# Patient Record
Sex: MALE | Race: BLACK OR AFRICAN AMERICAN | NOT HISPANIC OR LATINO | Employment: UNEMPLOYED | URBAN - METROPOLITAN AREA
[De-identification: names, ages, dates, MRNs, and addresses within clinical notes are randomized per-mention and may not be internally consistent; named-entity substitution may affect disease eponyms.]

---
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
## 2024-01-18 ENCOUNTER — OFFICE VISIT (OUTPATIENT)
Dept: OBGYN CLINIC | Facility: CLINIC | Age: 19
End: 2024-01-18
Payer: COMMERCIAL

## 2024-01-18 ENCOUNTER — APPOINTMENT (OUTPATIENT)
Dept: RADIOLOGY | Facility: AMBULARY SURGERY CENTER | Age: 19
End: 2024-01-18
Attending: STUDENT IN AN ORGANIZED HEALTH CARE EDUCATION/TRAINING PROGRAM
Payer: COMMERCIAL

## 2024-01-18 VITALS — BODY MASS INDEX: 23.62 KG/M2 | WEIGHT: 165 LBS | HEIGHT: 70 IN

## 2024-01-18 DIAGNOSIS — S72.302D CLOSED FRACTURE OF SHAFT OF LEFT FEMUR WITH ROUTINE HEALING, UNSPECIFIED FRACTURE MORPHOLOGY, SUBSEQUENT ENCOUNTER: Primary | ICD-10-CM

## 2024-01-18 DIAGNOSIS — S72.302D CLOSED FRACTURE OF SHAFT OF LEFT FEMUR WITH ROUTINE HEALING, UNSPECIFIED FRACTURE MORPHOLOGY, SUBSEQUENT ENCOUNTER: ICD-10-CM

## 2024-01-18 PROCEDURE — 73552 X-RAY EXAM OF FEMUR 2/>: CPT

## 2024-01-18 PROCEDURE — 99213 OFFICE O/P EST LOW 20 MIN: CPT | Performed by: STUDENT IN AN ORGANIZED HEALTH CARE EDUCATION/TRAINING PROGRAM

## 2024-01-18 NOTE — PROGRESS NOTES
Orthopaedic Surgery - Office Note  Laura Guerrero (18 y.o. male)   : 2005   MRN: [id number]  Encounter Date: 2024    Chief Complaint   Patient presents with    Left Hip - Post-op     Past Surgical History:   Procedure Laterality Date    [place] OPTX FEM SHFT FX W/INSJ IMED IMPLT W/WO SCREW Left 2023    Procedure: INSERTION NAIL IM FEMUR ANTEGRADE (TROCHANTERIC);  Surgeon: Theo Patel DO;  Location: [facility];  Service: Orthopedics     Assessment / Plan  #1 left proximal one third femoral shaft fracture, status post open reduction internal fixation with an intramedullary nail, antegrade on 2023    X-rays of the left femur were obtained today and discussed with patient which demonstrate maintained alignment of the patient's midshaft femoral fracture.  The patient has no change in alignment.  No signs of hardware loosening or failure.  Maturing of the callus at the fracture site with fracture union  Patient will continue to be weightbearing as tolerated activity as tolerated, range of motion as tolerated to the left lower extremity  Patient has no work restrictions or activity restrictions.  Patient will continue with physical therapy directed at strengthening of the hip abductors and continued range of motion of the left knee  Continue to take anti-inflammatory medications, Tylenol as needed for pain relief  Patient may follow-up PRN       History of Present Illness  Laura Guerrero is a 18 y.o. male who presents 7 days status post open reduction internal fixation with intramedullary nail placement of the left femur.  The patient had called about pain in the posterior thigh.  He was seen early due to this.  The patient has no pain while at rest however with any motion of the knee and then begins to scream in a histrionic fashion.  The patient has had no secondary falls since the injury.  No pain at the knee.  No instability.  Patient has had no drainage from any of his wounds.  No  redness or erythema.  The patient denies any numbness or paresthesias in the left lower extremity.    Interval history 7/11/2023:  The patient is doing well.  Today he has minimal soreness over left lateral hip.  The patient had his last visit was having issues with knee stiffness.  He was hesitant to start bending the knee and was only able to move the knee in a limited flexion arc.  The patient has started inpatient physical therapy and his range of motion of the left knee has improved dramatically.  His pain is much less than it was at his last visit.  Denies any numbness or paresthesias.  Denies any fevers or chills.  Steadily increasing the amount of weight he is putting on the left lower extremity.  Only using 1 crutch at today's visit..     Interval history 8/14/2023  Patient is an 17-year-old male who is now 8 weeks status post open reduction internal fixation of the left midshaft femur fracture.  Patient is doing much better.  He is working with physical therapy for range of motion and strengthening the left lower extremity.  He has weaned off of all assistive devices.  The patient still has a antalgic gait.  He denies pain with weightbearing or range of motion.  Denies any numbness or paresthesias.    Interval history 10/9/2023:  Patient presents 3+ months s/p left femur IM nail for mid-shaft fracture, 6/20/2023.  He is ding well and improving.  Today he complains of occasional mid-thigh generalized pain and lateral hip.  He can walk 10 minutes prior to increase of pain.  He rates his pain at 6/10.  He continues with physical therapy with benefit.  He denies medications for this issue.  He does not work.  Patient denies any numbness or paresthesias.  Denies any fevers or chills.  Patient is ambulating without limp    Interval history 1/18/2024  Patient presents approximately 7 months status post left femur IM nail for midshaft femur fracture on 6/20/2023.  He is feeling well overall and improving.   Complains of occasional mid thigh pain, leg weakness and lateral hip pain. He denies any significant pain at this time.  He has been able to return to work and is happy with his progress.  He states that the injury does not limit any of his activities at this time.  He denies any numbness tingling or paresthesias in the left lower extremity.  No other complaints at this time.    Review of Systems  Pertinent items are noted in HPI.  All other systems were reviewed and are negative.    Physical Exam  There were no vitals taken for this visit.  Cons: Appears well.  No apparent distress.  Psych: Alert. Oriented x3.  Mood and affect normal.  Eyes: PERRLA, EOMI  Resp: Normal effort.  No audible wheezing or stridor.  CV: Palpable pulse.  No discernable arrhythmia.    Lymph:  No palpable cervical, axillary, or inguinal lymphadenopathy.  Skin: Warm.  No palpable masses.  No visible lesions.  Neuro: Normal muscle tone.  Normal and symmetric DTR's.     The left lower extremity was exposed inspected.  Patient's previous surgical incisions were intact without any signs of dehiscence.  No surrounding erythema or induration.  Incisions well-healed and sealed.  The knee is stable to anterior and posterior drawer testing.  No laxity with varus and valgus stress.   Patient's range of motion today was from 0 to 120 degrees of flexion.  Without pain.  The patient has no tenderness up by the hip or in the posterior aspect of the thigh with palpation.  Nonantalgic gait pattern without assistive device. patient's compartments are soft compressible.  5 out of 5 muscle strength with knee extension, knee flexion, hip flexion, hip extension.  5 out of 5 hip abduction and adduction.patient's sensation is intact to light touch in the superficial peroneal, deep peroneal, sural, saphenous, plantar nerve distributions.  Tibialis anterior, extensor hallux longus, gastrocnemius muscles intact.  +2 dorsalis pedis and posterior tibial  pulses    Studies Reviewed  X-rays of the left femur were obtained today which demonstrate maintained alignment of the patient's midshaft femoral fracture.  The patient has no change in alignment.  No signs of hardware loosening or failure.  Abundant callus formation with maturing of the fracture union  Procedures      Medical, Surgical, Family, and Social History  The patient's medical history, family history, and social history, were reviewed and updated as appropriate.    No past medical history on file.        Family History   Problem Relation Age of Onset    No Known Problems Mother     No Known Problems Father        Social History     Occupational History    Not on file   Tobacco Use    Smoking status: Every Day     Types: Cigarettes     Passive exposure: Yes    Smokeless tobacco: Never   Vaping Use    Vaping status: Every Day    Substances: Nicotine, THC   Substance and Sexual Activity    Alcohol use: Not Currently    Drug use: Yes     Types: Marijuana    Sexual activity: Not on file       Allergies   Allergen Reactions    Other      Seasonal Allergies         Current Outpatient Medications:     acetaminophen (TYLENOL) 325 mg tablet, Take 2 tablets (650 mg total) by mouth every 4 (four) hours as needed for mild pain, Disp: , Rfl: 0    amoxicillin-clavulanate (AUGMENTIN) 875-125 mg per tablet, Take 1 tablet by mouth every 12 (twelve) hours Take for 10 days (d/t sinus infection), Disp: , Rfl:     aspirin (ECOTRIN LOW STRENGTH) 81 mg EC tablet, Take 1 tablet (81 mg total) by mouth 2 (two) times a day, Disp: 84 tablet, Rfl: 0    Cetirizine HCl (ZYRTEC CHILDRENS ALLERGY) 5 MG/5ML SOLN, Take 10 mL by mouth 3 (three) times a day, Disp: , Rfl:     docusate sodium (Colace) 100 mg capsule, Take 100 mg by mouth as needed for constipation. Indications: Constipation, Disp: , Rfl:     Ibuprofen 200 MG CAPS, Take 2 capsules by mouth if needed (pain/inflammation). Indications: Pain, Disp: , Rfl:     methocarbamol (ROBAXIN)  750 mg tablet, Take 1 tablet (750 mg total) by mouth every 6 (six) hours for 14 days, Disp: 56 tablet, Rfl: 0    oxyCODONE (Roxicodone) 5 immediate release tablet, Take 1 tablet (5 mg total) by mouth every 4 (four) hours as needed for moderate pain Max Daily Amount: 30 mg (Patient not taking: Reported on 8/14/2023), Disp: 30 tablet, Rfl: 0    pantoprazole (PROTONIX) 40 mg tablet, Take 1 tablet (40 mg total) by mouth daily, Disp: 42 tablet, Rfl: 0    senna-docusate sodium (SENOKOT S) 8.6-50 mg per tablet, Take 1 tablet by mouth daily at bedtime for 5 days, Disp: 5 tablet, Rfl: 0      Ritesh Hong PA-C    Scribe Attestation      I,:   am acting as a scribe while in the presence of the attending physician.:       I,:   personally performed the services described in this documentation    as scribed in my presence.:

## 2024-07-22 ENCOUNTER — HOSPITAL ENCOUNTER (EMERGENCY)
Facility: HOSPITAL | Age: 19
Discharge: HOME | End: 2024-07-22
Attending: INTERNAL MEDICINE
Payer: COMMERCIAL

## 2024-07-22 VITALS
BODY MASS INDEX: 20.36 KG/M2 | WEIGHT: 142.2 LBS | RESPIRATION RATE: 18 BRPM | HEIGHT: 70 IN | TEMPERATURE: 98.6 F | SYSTOLIC BLOOD PRESSURE: 120 MMHG | DIASTOLIC BLOOD PRESSURE: 68 MMHG | HEART RATE: 68 BPM | OXYGEN SATURATION: 99 %

## 2024-07-22 DIAGNOSIS — Z20.2 EXPOSURE TO STD: Primary | ICD-10-CM

## 2024-07-22 LAB
C TRACH DNA SPEC QL NAA+PROBE: NEGATIVE
N GONORRHOEA DNA SPEC QL NAA+PROBE: POSITIVE

## 2024-07-22 PROCEDURE — 99283 EMERGENCY DEPT VISIT LOW MDM: CPT

## 2024-07-22 PROCEDURE — 96372 THER/PROPH/DIAG INJ SC/IM: CPT

## 2024-07-22 PROCEDURE — 99284 EMERGENCY DEPT VISIT MOD MDM: CPT | Performed by: INTERNAL MEDICINE

## 2024-07-22 PROCEDURE — 87591 N.GONORRHOEAE DNA AMP PROB: CPT | Performed by: INTERNAL MEDICINE

## 2024-07-22 PROCEDURE — 87491 CHLMYD TRACH DNA AMP PROBE: CPT | Performed by: INTERNAL MEDICINE

## 2024-07-22 RX ORDER — DOXYCYCLINE HYCLATE 100 MG/1
100 CAPSULE ORAL 2 TIMES DAILY
Qty: 14 CAPSULE | Refills: 0 | Status: SHIPPED | OUTPATIENT
Start: 2024-07-22 | End: 2024-07-29

## 2024-07-22 RX ORDER — DOXYCYCLINE HYCLATE 100 MG/1
100 CAPSULE ORAL ONCE
Status: COMPLETED | OUTPATIENT
Start: 2024-07-22 | End: 2024-07-22

## 2024-07-22 RX ADMIN — LIDOCAINE HYDROCHLORIDE 500 MG: 10 INJECTION, SOLUTION EPIDURAL; INFILTRATION; INTRACAUDAL; PERINEURAL at 07:52

## 2024-07-22 RX ADMIN — DOXYCYCLINE 100 MG: 100 CAPSULE ORAL at 07:52

## 2024-07-22 NOTE — DISCHARGE INSTRUCTIONS
Follow-up with your PMD.  Take medication as prescribed.  Your significant other need to be treated also.      Call you about the results of the GC/chlamydia.

## 2024-07-22 NOTE — ED PROVIDER NOTES
History  Chief Complaint   Patient presents with    Exposure to STD     Pt reports possible exposure and will like to be tested     This is a 18 years old came for having penile discharge.  Patient stated that he found yellowish-whitish thick discharge coming from the penis.  Patient also stated that he has some dysuria.  Patient has no hematuria.  Patient has no fever.  Patient is sexually active with his girlfriend and the last sexual activity was 2 days ago.  Patient has history of chlamydia in the past and it was treated.  Patient denies other STDs.  Currently patient takes no medication.  Patient had ORIF of left femur.  Patient denies any fever, abdominal pain, flank pain.  Patient denies history of kidney stones.        Prior to Admission Medications   Prescriptions Last Dose Informant Patient Reported? Taking?   Cetirizine HCl (ZYRTEC CHILDRENS ALLERGY) 5 MG/5ML SOLN  Mother, Self Yes No   Sig: Take 10 mL by mouth 3 (three) times a day   Ibuprofen 200 MG CAPS  Self, Mother Yes No   Sig: Take 2 capsules by mouth if needed (pain/inflammation). Indications: Pain   acetaminophen (TYLENOL) 325 mg tablet  Self, Mother No No   Sig: Take 2 tablets (650 mg total) by mouth every 4 (four) hours as needed for mild pain   amoxicillin-clavulanate (AUGMENTIN) 875-125 mg per tablet  Mother, Self Yes No   Sig: Take 1 tablet by mouth every 12 (twelve) hours Take for 10 days (d/t sinus infection)   aspirin (ECOTRIN LOW STRENGTH) 81 mg EC tablet  Self No No   Sig: Take 1 tablet (81 mg total) by mouth 2 (two) times a day   docusate sodium (Colace) 100 mg capsule  Self, Mother Yes No   Sig: Take 100 mg by mouth as needed for constipation. Indications: Constipation   methocarbamol (ROBAXIN) 750 mg tablet  Self No No   Sig: Take 1 tablet (750 mg total) by mouth every 6 (six) hours for 14 days   oxyCODONE (Roxicodone) 5 immediate release tablet  Self, Mother No No   Sig: Take 1 tablet (5 mg total) by mouth every 4 (four) hours as  needed for moderate pain Max Daily Amount: 30 mg   Patient not taking: Reported on 1/18/2024   pantoprazole (PROTONIX) 40 mg tablet  Self No No   Sig: Take 1 tablet (40 mg total) by mouth daily   senna-docusate sodium (SENOKOT S) 8.6-50 mg per tablet  Self No No   Sig: Take 1 tablet by mouth daily at bedtime for 5 days      Facility-Administered Medications: None       History reviewed. No pertinent past medical history.    Past Surgical History:   Procedure Laterality Date    [place] OPTX FEM SHFT FX W/INSJ IMED IMPLT W/WO SCREW Left 6/20/2023    Procedure: INSERTION NAIL IM FEMUR ANTEGRADE (TROCHANTERIC);  Surgeon: Theo Patel DO;  Location[facility];  Service: Orthopedics       Family History   Problem Relation Age of Onset    No Known Problems Mother     No Known Problems Father      I have reviewed and agree with the history as documented.    E-Cigarette/Vaping    E-Cigarette Use Current Every Day User      E-Cigarette/Vaping Substances    Nicotine Yes     THC Yes      Social History     Tobacco Use    Smoking status: Former     Types: Cigarettes     Passive exposure: Yes    Smokeless tobacco: Never   Vaping Use    Vaping status: Every Day    Substances: Nicotine, THC   Substance Use Topics    Alcohol use: Not Currently     Comment: Socially    Drug use: Yes     Types: Marijuana       Review of Systems   Constitutional:  Negative for chills and fever.   HENT:  Negative for congestion and sore throat.    Eyes:  Negative for pain and visual disturbance.   Respiratory:  Negative for cough and shortness of breath.    Cardiovascular:  Negative for chest pain and palpitations.   Gastrointestinal:  Negative for abdominal pain and vomiting.   Genitourinary:  Positive for dysuria and penile discharge. Negative for decreased urine volume, difficulty urinating, flank pain, frequency, hematuria, penile pain, penile swelling, scrotal swelling, testicular pain and urgency.   Musculoskeletal:  Negative for arthralgias and  back pain.   Skin:  Negative for color change and rash.   Neurological:  Negative for seizures and syncope.   Hematological:  Negative for adenopathy. Does not bruise/bleed easily.   Psychiatric/Behavioral:  Negative for agitation and behavioral problems.    All other systems reviewed and are negative.      Physical Exam  Physical Exam  Vitals and nursing note reviewed.   Constitutional:       General: He is not in acute distress.     Appearance: He is well-developed. He is not ill-appearing, toxic-appearing or diaphoretic.   HENT:      Head: Normocephalic and atraumatic.      Right Ear: Ear canal normal.      Left Ear: Ear canal normal.      Nose: Nose normal. No congestion or rhinorrhea.      Mouth/Throat:      Mouth: Mucous membranes are moist.      Pharynx: No oropharyngeal exudate or posterior oropharyngeal erythema.   Eyes:      Extraocular Movements: Extraocular movements intact.      Conjunctiva/sclera: Conjunctivae normal.      Pupils: Pupils are equal, round, and reactive to light.   Cardiovascular:      Rate and Rhythm: Normal rate and regular rhythm.      Heart sounds: No murmur heard.     No friction rub. No gallop.   Pulmonary:      Effort: Pulmonary effort is normal. No respiratory distress.      Breath sounds: Normal breath sounds. No stridor.   Abdominal:      General: Abdomen is flat. There is no distension.      Palpations: Abdomen is soft. There is no mass.      Tenderness: There is no abdominal tenderness. There is no right CVA tenderness, left CVA tenderness, guarding or rebound.      Hernia: No hernia is present.   Genitourinary:     Comments: Patient refused Genital exam.   Musculoskeletal:         General: No swelling or tenderness.      Cervical back: Normal range of motion and neck supple.   Skin:     General: Skin is warm and dry.      Capillary Refill: Capillary refill takes less than 2 seconds.   Neurological:      Mental Status: He is alert and oriented to person, place, and time.    Psychiatric:         Mood and Affect: Mood normal.         Behavior: Behavior normal.         Vital Signs  ED Triage Vitals [07/22/24 0717]   Temperature Pulse Respirations Blood Pressure SpO2   98.6 °F (37 °C) 68 18 120/68 99 %      Temp Source Heart Rate Source Patient Position - Orthostatic VS BP Location FiO2 (%)   Oral Monitor Lying Left arm --      Pain Score       --           Vitals:    07/22/24 0717   BP: 120/68   Pulse: 68   Patient Position - Orthostatic VS: Lying         Visual Acuity      ED Medications  Medications   cefTRIAXone (ROCEPHIN) 500 mg in lidocaine (PF) (XYLOCAINE-MPF) 1 % IM only syringe (500 mg Intramuscular Given 7/22/24 0752)   doxycycline hyclate (VIBRAMYCIN) capsule 100 mg (100 mg Oral Given 7/22/24 0752)       Diagnostic Studies  Results Reviewed       Procedure Component Value Units Date/Time    Chlamydia/GC amplified DNA by PCR [[id number]]  (Abnormal) Collected: 07/22/24 0751    Lab Status: Final result Specimen: Urine, Other Updated: 07/22/24 1450     N gonorrhoeae, DNA Probe Positive     Chlamydia trachomatis, DNA Probe Negative    Narrative:      This test was performed using the FDA-approved Stephan 6800 CT/NG assay ([employer]). This test uses real-time PCR to detect Chlamydia trachomatis (CT) and Neisseria gonorrhoeae (NG). This instrument and assay have been validated by the  and performing laboratory and verified by the performing laboratory.  This test is intended as an aid in the diagnosis of chlamydial and gonococcal disease. This test has not been evaluated in patients younger than 14 years of age and is not recommended for evaluation of suspected sexual abuse. This assay is not intended to replace other exams or tests for diagnosis of urogenital infection by causative factors other than Chalmydia trachomatis (CT) and Neisseria gonorrhoeae (NG). Additional testing is recommended when the results do not correlate with clinical signs and symptoms.    Procedural Limitations  This assay has only been validated for use with male and female urine, clinician-instructed self-collected vaginal swab specimens, clinician-collected vaginal swab specimens, endocervical swab specimens collected in silke® PCR Media and cervical specimens collected in PreservCyt® Solution. Assay performance has not been validated for use with other collection media and/or specimen types.   Detection of C. trachomatis and N. gonorrhoeaea is dependent on the number of organisms present in the specimen. Detection may be affected by specimen collection methods, patient factors, stage of infection, infecting strains, and presence of polymerase/PCR inhibitors.   When CT is present at very high concentrations, the detection of NG present at concentrations near the limit of detection may be impacted.  The presence of mucus in endocervical specimens may lead to false negative results.  The presence of whole blood in urine and cervical specimens collected in PreservCyt Solution may lead to false negative and/or invalid test results.   Urine testing is recommended to be performed on first catch urine samples. The effects of other collection variables have not been evaluated at this time. The effects of vaginal discharge, tampon use, douching, and other collection variables have not been evaluated at this time.   This assay has not been evaluated with patients currently being treated with antimicrobial agents active against CT or NG, or patients with a history of hysterectomy.                    No orders to display              Procedures  Procedures         ED Course                                               Medical Decision Making  Patient is a 18 years old came for having penile discharge.  Patient is stated that he has yellowish-whitish discharge.  Patient has pain on urination which is occasional.  Patient has no hematuria.  Patient had history of chlamydia in the past.  Patient is sexually  active and last sexual activity was 2 days ago.  Patient denies fever, abdominal pain, flank pain.  Patient refused genital exam.  The rest of the physical exam shows no pertinent positive findings.  Urine was sent for GC/chlamydia.  Patient given Rocephin 500 mg IM and doxycycline 100 mg.  Patient to be discharged on doxycycline twice daily for 7 days.  Patient informed that we are sara call him back about the results of the GC/chlamydia.  And it is very important that his girlfriend to be treated and to be tested.  Patient discharged home and follow-up with your PMD.  All question concerns of patient have been answered.    Amount and/or Complexity of Data Reviewed  Labs: ordered.    Risk  Prescription drug management.                 Disposition  Final diagnoses:   Exposure to STD     Time reflects when diagnosis was documented in both MDM as applicable and the Disposition within this note       Time User Action Codes Description Comment    7/22/2024  7:49 AM Dionna Benavides Add [Z20.2] Exposure to STD           ED Disposition       ED Disposition   Discharge    Condition   Stable    Date/Time   Mon Jul 22, 2024  8:24 AM    Comment   Laura Guerrero discharge to home/self care.                   Follow-up Information       Follow up With Specialties Details Why Contact Info    Germán Conte MD Pediatrics In 1 week  [address] [address]  [phone]              Discharge Medication List as of 7/22/2024  8:24 AM        START taking these medications    Details   doxycycline hyclate (VIBRAMYCIN) 100 mg capsule Take 1 capsule (100 mg total) by mouth 2 (two) times a day for 7 days, Starting Mon 7/22/2024, Until Mon 7/29/2024, Normal           CONTINUE these medications which have NOT CHANGED    Details   acetaminophen (TYLENOL) 325 mg tablet Take 2 tablets (650 mg total) by mouth every 4 (four) hours as needed for mild pain, Starting Wed 6/21/2023, No Print      amoxicillin-clavulanate  (AUGMENTIN) 875-125 mg per tablet Take 1 tablet by mouth every 12 (twelve) hours Take for 10 days (d/t sinus infection), Starting Wed 4/24/2019, Historical Med      aspirin (ECOTRIN LOW STRENGTH) 81 mg EC tablet Take 1 tablet (81 mg total) by mouth 2 (two) times a day, Starting Wed 6/21/2023, Until Mon 8/14/2023, Normal      Cetirizine HCl (ZYRTE CHILDRENS ALLERGY) 5 MG/5ML SOLN Take 10 mL by mouth 3 (three) times a day, Historical Med      docusate sodium (Colace) 100 mg capsule Take 100 mg by mouth as needed for constipation. Indications: Constipation, Historical Med      Ibuprofen 200 MG CAPS Take 2 capsules by mouth if needed (pain/inflammation). Indications: Pain, Historical Med      methocarbamol (ROBAXIN) 750 mg tablet Take 1 tablet (750 mg total) by mouth every 6 (six) hours for 14 days, Starting Wed 6/21/2023, Until Mon 8/14/2023, Normal      oxyCODONE (Roxicodone) 5 immediate release tablet Take 1 tablet (5 mg total) by mouth every 4 (four) hours as needed for moderate pain Max Daily Amount: 30 mg, Starting Tue 6/27/2023, Normal      pantoprazole (PROTONIX) 40 mg tablet Take 1 tablet (40 mg total) by mouth daily, Starting Wed 6/21/2023, Until Mon 8/14/2023, Normal      senna-docusate sodium (SENOKOT S) 8.6-50 mg per tablet Take 1 tablet by mouth daily at bedtime for 5 days, Starting Wed 6/21/2023, Until Mon 8/14/2023, Normal             No discharge procedures on file.    PDMP Review         Value Time User    PDMP Reviewed  Yes 6/27/2023  4:16 PM Theo Patel DO            ED Provider  Electronically Signed by             Dionna Benavides MD  07/23/24 8137

## 2024-09-02 ENCOUNTER — HOSPITAL ENCOUNTER (EMERGENCY)
Facility: HOSPITAL | Age: 19
Discharge: HOME | End: 2024-09-02
Attending: EMERGENCY MEDICINE
Payer: COMMERCIAL

## 2024-09-02 VITALS
SYSTOLIC BLOOD PRESSURE: 124 MMHG | DIASTOLIC BLOOD PRESSURE: 75 MMHG | HEART RATE: 68 BPM | OXYGEN SATURATION: 100 % | BODY MASS INDEX: 20.04 KG/M2 | TEMPERATURE: 98.8 F | HEIGHT: 70 IN | RESPIRATION RATE: 16 BRPM | WEIGHT: 140 LBS

## 2024-09-02 DIAGNOSIS — Z20.2 ENCOUNTER FOR ASSESSMENT OF STD EXPOSURE: Primary | ICD-10-CM

## 2024-09-02 LAB
BACTERIA UR QL AUTO: ABNORMAL /HPF
BILIRUB UR QL STRIP: NEGATIVE
CLARITY UR: CLEAR
COLOR UR: YELLOW
GLUCOSE UR STRIP-MCNC: NEGATIVE MG/DL
HGB UR QL STRIP.AUTO: NEGATIVE
HIV 1+2 AB+HIV1 P24 AG SERPL QL IA: NORMAL
HIV1 P24 AG SER QL: NORMAL
KETONES UR STRIP-MCNC: NEGATIVE MG/DL
LEUKOCYTE ESTERASE UR QL STRIP: ABNORMAL
MUCOUS THREADS UR QL AUTO: ABNORMAL
NITRITE UR QL STRIP: NEGATIVE
NON-SQ EPI CELLS URNS QL MICRO: ABNORMAL /HPF
PH UR STRIP.AUTO: 6.5 [PH]
PROT UR STRIP-MCNC: ABNORMAL MG/DL
RBC #/AREA URNS AUTO: ABNORMAL /HPF
SP GR UR STRIP.AUTO: >=1.03 (ref 1–1.03)
UROBILINOGEN UR STRIP-ACNC: 2 MG/DL
WBC #/AREA URNS AUTO: ABNORMAL /HPF

## 2024-09-02 PROCEDURE — 87491 CHLMYD TRACH DNA AMP PROBE: CPT | Performed by: EMERGENCY MEDICINE

## 2024-09-02 PROCEDURE — 81001 URINALYSIS AUTO W/SCOPE: CPT | Performed by: EMERGENCY MEDICINE

## 2024-09-02 PROCEDURE — 96372 THER/PROPH/DIAG INJ SC/IM: CPT

## 2024-09-02 PROCEDURE — 99283 EMERGENCY DEPT VISIT LOW MDM: CPT

## 2024-09-02 PROCEDURE — 87806 HIV AG W/HIV1&2 ANTB W/OPTIC: CPT | Performed by: EMERGENCY MEDICINE

## 2024-09-02 PROCEDURE — 36415 COLL VENOUS BLD VENIPUNCTURE: CPT | Performed by: EMERGENCY MEDICINE

## 2024-09-02 PROCEDURE — 87086 URINE CULTURE/COLONY COUNT: CPT | Performed by: EMERGENCY MEDICINE

## 2024-09-02 PROCEDURE — 99284 EMERGENCY DEPT VISIT MOD MDM: CPT | Performed by: EMERGENCY MEDICINE

## 2024-09-02 PROCEDURE — 87591 N.GONORRHOEAE DNA AMP PROB: CPT | Performed by: EMERGENCY MEDICINE

## 2024-09-02 PROCEDURE — 86780 TREPONEMA PALLIDUM: CPT | Performed by: EMERGENCY MEDICINE

## 2024-09-02 RX ORDER — DOXYCYCLINE 100 MG/1
100 CAPSULE ORAL EVERY 12 HOURS SCHEDULED
Qty: 16 CAPSULE | Refills: 0 | Status: SHIPPED | OUTPATIENT
Start: 2024-09-02 | End: 2024-09-10

## 2024-09-02 RX ORDER — DOXYCYCLINE 100 MG/1
100 CAPSULE ORAL ONCE
Status: COMPLETED | OUTPATIENT
Start: 2024-09-02 | End: 2024-09-02

## 2024-09-02 RX ADMIN — DOXYCYCLINE 100 MG: 100 CAPSULE ORAL at 06:47

## 2024-09-02 RX ADMIN — LIDOCAINE HYDROCHLORIDE 500 MG: 10 INJECTION, SOLUTION EPIDURAL; INFILTRATION; INTRACAUDAL; PERINEURAL at 06:47

## 2024-09-02 NOTE — ED PROVIDER NOTES
Final Diagnosis:  1. Encounter for assessment of STD exposure        Chief Complaint   Patient presents with    Exposure to STD     Patient requesting STD testing for gonorrhea; thinks he was exposed a few days ago and now has some burning and discharge.        HPI  Pt pres w/ STD exposure penile d/c. 2023 had chlamydia. 1 mo ago w/ gonorrhea. Says his monogamous partner was treated and he finished tx. Now w/ repeat of similar symptoms, mainly burning and penile d/c. No sores. No fever/chills/rash/systemic symptoms.     EMS additionally reports:     - Previous charting underwent limited review with attention to last ED visits, labs, ekgs, and prior imaging.  Chart review reveals :     Admission on 07/22/2024, Discharged on 07/22/2024   Component Date Value Ref Range Status    N gonorrhoeae, DNA Probe 07/22/2024 Positive (A)  Negative Final    Chlamydia trachomatis, DNA Probe 07/22/2024 Negative  Negative Final       - No language barrier.   - History obtained from patient    - Discuss patient's care, with patient permission or by chart review, with      PMH:   has no past medical history on file.    PSH:   has a past surgical history that includes pr optx fem shft fx w/insj imed implt w/wo screw (Left, 6/20/2023).     Social History:  Tobacco Use: Medium Risk (9/2/2024)    Patient History     Smoking Tobacco Use: Former     Smokeless Tobacco Use: Never     Passive Exposure: Yes     Alcohol Use: Not on file     No illicit use       ROS:  Pertinent positives/negatives: .     Some ROS may be present in the HPI and would take precedent over these standard questions asked below.   Review of Systems   Constitutional:  Negative for chills and fever.   Genitourinary:  Positive for dysuria and penile discharge. Negative for genital sores and testicular pain.   Skin:  Negative for rash.        CONSTITUTIONAL:  No lethargy. No unexpected weight loss. No change in behavior.  EYES:  No pain, redness, or discharge. No loss of  "vision. No orbital trauma or pain.   ENT:  No tinnitus or decreased hearing. No epistaxis/purulent rhinorrhea. No voice change, airway closing, trismus.   CARDIOVASCULAR:  No chest pain. No skin mottling or pallor. No change in exertional capacity  RESPIRATORY:  No hemoptysis. No paroxysmal nocturnal dyspnea. No stridor. No apnea or bluing.   GASTROINTESTINAL:  No vomiting, diarrhea. No distension. No melena. No hematochezia.   GENITOURINARY:  No nocturia. No hematuria or foul smelling or cloudy urine. No discharge. No sores/adenopathy.   MUSCULOSKELETAL:  No contracture.  No new deformity.   INTEGUMENTARY:  No swelling. No unexpected contusions. No abrasions. No lymphangitis.  NEUROLOGIC:  No meningismus. No new numbness of the extremities. No new focal weakness. No postural instability  PSYCHIATRIC:  No SI HI AVH  HEMATOLOGICAL:  No bleeding. No petechiae. No bruising.  ALLERGIES:  No urticaria. No sudden abd cramping. No stridor.    PE:     Physical exam highlights:   Physical Exam       Vitals:    09/02/24 0635   BP: 124/75   BP Location: Right arm   Pulse: 68   Resp: 16   Temp: 98.8 °F (37.1 °C)   TempSrc: Oral   SpO2: 100%   Weight: 63.5 kg (140 lb)   Height: 5' 10\" (1.778 m)     Vitals reviewed by me.   Nursing note reviewed  Chaperone present for all sensitive exam - NA  Patient refuses genital exam.   Const: No acute distress. Alert. Nontoxic. Not diaphoretic.    HEENT: External ears normal. No protrusion drainage swelling. Nose normal. No drainage/traumatic deformity. MM. Mouth with baseline/symmetric movement. No trismus.   Eyes: No squinting. No icterus. No tearing/swelling/drainage. Tracks through the room with normal EOM.   Neck: ROM normal. No rigidity. No meningismus.  Cards: Rate as per vitals Compared to monitor sinus unless documented. Regular Well perfused.  Pulm: Effort and excursion normal. No distress. No audible wheezing/no stridor. Normal resp rate without retraction or change in work of " breathing.  Abd: No distension beyond baseline. No fluctuant wave. Patient without peritoneal pain with shifting/bumping the bed.   MSK: ROM normal baseline. No deformity. No contractures from baseline.   Skin: No new rashes visible. Well perfused. No wounds visualized on exposed skin  Neuro: Nonfocal. Baseline. CN grossly intact. Moving all four with coordination.   Psych: Normal behavior and affect.        A:  - Nursing note reviewed.    Ddx and MDM  Considered diagnoses    Given d/c and similarity to prior disease, will tx w/ ceftriaoxne and doxy    Partner testing and tx    HIV and syph co-testing to be performed today        Dispo decision       My conversation with consultant reveals:        Decision rules:                           My read of the XR/CT scan reveals:     No orders to display       Orders Placed This Encounter   Procedures    Urine culture    Chlamydia/GC amplified DNA by PCR    UA (URINE) with reflex to Scope    RAPID HIV 1/2 AB-AG COMBO for 12 years old and above    RPR-Syphilis Screening (Total Syphilis IGG/IGM)     Labs Reviewed - No data to display    *Each of these labs was reviewed. Particular standout labs will be noted in the ED Course above     Final Diagnosis:  1. Encounter for assessment of STD exposure          P:  - hospital tx includes   Medications   cefTRIAXone (ROCEPHIN) 500 mg in lidocaine (PF) (XYLOCAINE-MPF) 1 % IM only syringe (500 mg Intramuscular Given 9/2/24 0647)   doxycycline hyclate (VIBRAMYCIN) capsule 100 mg (100 mg Oral Given 9/2/24 0647)         - disposition  Time reflects when diagnosis was documented in both MDM as applicable and the Disposition within this note       Time User Action Codes Description Comment    9/2/2024  6:48 AM Uri Cervantes Add [Z20.2] Encounter for assessment of STD exposure           ED Disposition       ED Disposition   Discharge    Condition   Stable    Date/Time   Mon Sep 2, 2024  6:48 AM    Comment   Laura Guerrero discharge  to home/self care.                   Follow-up Information       Follow up With Specialties Details Why Contact Info    Germán Conte MD Pediatrics   [address] [place] [address]  [phone]              - patient will call their PCP to let them know they were in the emergency department. We discuss return precautions and patient is agreeable with plan and aformentioned disposition.       - additional treatment intended, if consistent with primary provider:  - patient to follow with :      Patient's Medications   Discharge Prescriptions    DOXYCYCLINE HYCLATE (VIBRAMYCIN) 100 MG CAPSULE    Take 1 capsule (100 mg total) by mouth every 12 (twelve) hours for 8 days       Start Date: 9/2/2024  End Date: 9/10/2024       Order Dose: 100 mg       Quantity: 16 capsule    Refills: 0     No discharge procedures on file.  Prior to Admission Medications   Prescriptions Last Dose Informant Patient Reported? Taking?   Cetirizine HCl (ZYRTEC CHILDRENS ALLERGY) 5 MG/5ML SOLN  Mother, Self Yes No   Sig: Take 10 mL by mouth 3 (three) times a day   Ibuprofen 200 MG CAPS  Self, Mother Yes No   Sig: Take 2 capsules by mouth if needed (pain/inflammation). Indications: Pain   acetaminophen (TYLENOL) 325 mg tablet  Self, Mother No No   Sig: Take 2 tablets (650 mg total) by mouth every 4 (four) hours as needed for mild pain   amoxicillin-clavulanate (AUGMENTIN) 875-125 mg per tablet  Mother, Self Yes No   Sig: Take 1 tablet by mouth every 12 (twelve) hours Take for 10 days (d/t sinus infection)   aspirin (ECOTRIN LOW STRENGTH) 81 mg EC tablet  Self No No   Sig: Take 1 tablet (81 mg total) by mouth 2 (two) times a day   docusate sodium (Colace) 100 mg capsule  Self, Mother Yes No   Sig: Take 100 mg by mouth as needed for constipation. Indications: Constipation   methocarbamol (ROBAXIN) 750 mg tablet  Self No No   Sig: Take 1 tablet (750 mg total) by mouth every 6 (six) hours for 14 days   oxyCODONE (Roxicodone) 5 immediate  "release tablet  Self, Mother No No   Sig: Take 1 tablet (5 mg total) by mouth every 4 (four) hours as needed for moderate pain Max Daily Amount: 30 mg   Patient not taking: Reported on 1/18/2024   pantoprazole (PROTONIX) 40 mg tablet  Self No No   Sig: Take 1 tablet (40 mg total) by mouth daily   senna-docusate sodium (SENOKOT S) 8.6-50 mg per tablet  Self No No   Sig: Take 1 tablet by mouth daily at bedtime for 5 days      Facility-Administered Medications: None       Portions of the record may have been created with voice recognition software. Occasional wrong word or \"sound a like\" substitutions may have occurred due to the inherent limitations of voice recognition software. Read the chart carefully and recognize, using context, where substitutions have occurred.    Electronically signed by:  MD Uri Head MD  09/02/24 0653    "

## 2024-09-03 LAB
BACTERIA UR CULT: NORMAL
C TRACH DNA SPEC QL NAA+PROBE: NEGATIVE
N GONORRHOEA DNA SPEC QL NAA+PROBE: POSITIVE
TREPONEMA PALLIDUM IGG+IGM AB [PRESENCE] IN SERUM OR PLASMA BY IMMUNOASSAY: NORMAL

## 2025-08-08 ENCOUNTER — OFFICE VISIT (OUTPATIENT)
Dept: URGENT CARE | Facility: CLINIC | Age: 20
End: 2025-08-08
Payer: COMMERCIAL

## 2025-08-08 VITALS
RESPIRATION RATE: 18 BRPM | HEIGHT: 70 IN | TEMPERATURE: 97.5 F | OXYGEN SATURATION: 99 % | DIASTOLIC BLOOD PRESSURE: 80 MMHG | HEART RATE: 98 BPM | BODY MASS INDEX: 20.96 KG/M2 | WEIGHT: 146.4 LBS | SYSTOLIC BLOOD PRESSURE: 110 MMHG

## 2025-08-08 DIAGNOSIS — Z20.2 POSSIBLE EXPOSURE TO STD: Primary | ICD-10-CM

## 2025-08-08 PROCEDURE — 87591 N.GONORRHOEAE DNA AMP PROB: CPT | Performed by: PHYSICIAN ASSISTANT

## 2025-08-08 PROCEDURE — S9083 URGENT CARE CENTER GLOBAL: HCPCS | Performed by: PHYSICIAN ASSISTANT

## 2025-08-08 PROCEDURE — G0382 LEV 3 HOSP TYPE B ED VISIT: HCPCS | Performed by: PHYSICIAN ASSISTANT

## 2025-08-08 PROCEDURE — 87491 CHLMYD TRACH DNA AMP PROBE: CPT | Performed by: PHYSICIAN ASSISTANT

## 2025-08-09 LAB
C TRACH DNA SPEC QL NAA+PROBE: NEGATIVE
N GONORRHOEA DNA SPEC QL NAA+PROBE: NEGATIVE